# Patient Record
Sex: FEMALE | Race: WHITE | NOT HISPANIC OR LATINO | Employment: OTHER | URBAN - METROPOLITAN AREA
[De-identification: names, ages, dates, MRNs, and addresses within clinical notes are randomized per-mention and may not be internally consistent; named-entity substitution may affect disease eponyms.]

---

## 2019-03-08 NOTE — PATIENT DISCUSSION
Monitor. Some residual astigmatism OU. Discussed possible need for LASIK to improve distance vision.

## 2019-10-10 NOTE — PATIENT DISCUSSION
ASSESSMENT/PLAN:   Toe pain  Patient with pain to her toe after trauma. Has moderately ingrown nail which may be contributing. Recommend that she see podiatrist for evaluation. May need the nail removed or trimmed better. Recommended observation.

## 2021-06-28 ENCOUNTER — NEW PATIENT EMERGENCY (OUTPATIENT)
Dept: URBAN - METROPOLITAN AREA CLINIC 38 | Facility: CLINIC | Age: 36
End: 2021-06-28

## 2021-06-28 DIAGNOSIS — H00.034: ICD-10-CM

## 2021-06-28 PROCEDURE — 99203 OFFICE O/P NEW LOW 30 MIN: CPT

## 2021-06-28 RX ORDER — AMOXICILLIN AND CLAVULANATE POTASSIUM 875; 125 MG/1; 1/1
1 TABLET, FILM COATED ORAL TWICE A DAY
Start: 2021-06-28 | End: 2021-06-28

## 2021-06-28 RX ORDER — AZITHROMYCIN DIHYDRATE 250 MG/1: TABLET, FILM COATED ORAL

## 2021-06-28 ASSESSMENT — VISUAL ACUITY
OS_CC: 20/20
OD_CC: 20/20-1

## 2021-06-28 ASSESSMENT — TONOMETRY
OS_IOP_MMHG: 15
OD_IOP_MMHG: 15

## 2021-07-14 ENCOUNTER — NEW PATIENT EMERGENCY (OUTPATIENT)
Dept: URBAN - METROPOLITAN AREA CLINIC 38 | Facility: CLINIC | Age: 36
End: 2021-07-14

## 2021-07-14 DIAGNOSIS — H52.203: ICD-10-CM

## 2021-07-14 DIAGNOSIS — H00.034: ICD-10-CM

## 2021-07-14 DIAGNOSIS — H52.13: ICD-10-CM

## 2021-07-14 DIAGNOSIS — H04.123: ICD-10-CM

## 2021-07-14 DIAGNOSIS — H02.88B: ICD-10-CM

## 2021-07-14 DIAGNOSIS — H16.223: ICD-10-CM

## 2021-07-14 DIAGNOSIS — H02.88A: ICD-10-CM

## 2021-07-14 PROCEDURE — 92012 INTRM OPH EXAM EST PATIENT: CPT

## 2021-07-14 PROCEDURE — A4262 TEMPORARY TEAR DUCT PLUG: HCPCS

## 2021-07-14 PROCEDURE — 92015 DETERMINE REFRACTIVE STATE: CPT

## 2021-07-14 PROCEDURE — 68761Q PUNCTAL PLUG/QUINTESS DISSOLVABLE PLUG/EACH

## 2021-07-14 RX ORDER — CYCLOSPORINE 0 MG/ML: 1 SOLUTION/ DROPS OPHTHALMIC; TOPICAL TWICE A DAY

## 2021-07-14 RX ORDER — MINERAL OIL 2; 2 MG/.4ML; MG/.4ML: 1 EMULSION OPHTHALMIC

## 2021-07-14 ASSESSMENT — VISUAL ACUITY
OU_CC: 20/20
OS_CC: 20/20-1
OD_CC: 20/20

## 2021-07-14 ASSESSMENT — TONOMETRY
OD_IOP_MMHG: 12
OS_IOP_MMHG: 10

## 2021-08-11 ENCOUNTER — ESTABLISHED COMPREHENSIVE EXAM (OUTPATIENT)
Dept: URBAN - METROPOLITAN AREA CLINIC 38 | Facility: CLINIC | Age: 36
End: 2021-08-11

## 2021-08-11 DIAGNOSIS — H04.123: ICD-10-CM

## 2021-08-11 DIAGNOSIS — H02.88A: ICD-10-CM

## 2021-08-11 DIAGNOSIS — H02.88B: ICD-10-CM

## 2021-08-11 DIAGNOSIS — H16.223: ICD-10-CM

## 2021-08-11 DIAGNOSIS — H00.034: ICD-10-CM

## 2021-08-11 PROCEDURE — 92012 INTRM OPH EXAM EST PATIENT: CPT

## 2021-08-11 RX ORDER — LOTEPREDNOL ETABONATE 5 MG/ML: 1 SUSPENSION/ DROPS OPHTHALMIC AS DIRECTED

## 2021-08-11 ASSESSMENT — VISUAL ACUITY
OS_SC: 20/200
OS_SC: J12
OS_CC: 20/20
OD_SC: J12
OS_CC: J1
OD_SC: 20/200
OD_CC: J1
OD_CC: 20/20

## 2021-08-11 ASSESSMENT — TONOMETRY
OD_IOP_MMHG: 14
OS_IOP_MMHG: 13

## 2021-09-21 ENCOUNTER — FOLLOW UP (OUTPATIENT)
Dept: URBAN - METROPOLITAN AREA CLINIC 38 | Facility: CLINIC | Age: 36
End: 2021-09-21

## 2021-09-21 DIAGNOSIS — H16.223: ICD-10-CM

## 2021-09-21 DIAGNOSIS — H02.88B: ICD-10-CM

## 2021-09-21 DIAGNOSIS — H02.88A: ICD-10-CM

## 2021-09-21 DIAGNOSIS — H04.123: ICD-10-CM

## 2021-09-21 PROCEDURE — 92012 INTRM OPH EXAM EST PATIENT: CPT

## 2021-09-21 RX ORDER — MINERAL OIL, PETROLATUM 425; 573 MG/G; MG/G: OINTMENT OPHTHALMIC EVERY EVENING

## 2021-09-21 ASSESSMENT — TONOMETRY
OS_IOP_MMHG: 14
OD_IOP_MMHG: 16

## 2021-09-21 ASSESSMENT — VISUAL ACUITY
OD_CC: 20/20-1
OS_CC: 20/20

## 2021-10-05 ENCOUNTER — CORNEA CONSULT (OUTPATIENT)
Dept: URBAN - METROPOLITAN AREA CLINIC 43 | Facility: CLINIC | Age: 36
End: 2021-10-05

## 2021-10-05 DIAGNOSIS — H10.13: ICD-10-CM

## 2021-10-05 DIAGNOSIS — H04.123: ICD-10-CM

## 2021-10-05 DIAGNOSIS — H02.88B: ICD-10-CM

## 2021-10-05 DIAGNOSIS — H16.223: ICD-10-CM

## 2021-10-05 DIAGNOSIS — H02.88A: ICD-10-CM

## 2021-10-05 PROCEDURE — 67999I ILUX- MGD

## 2021-10-05 PROCEDURE — 99214 OFFICE O/P EST MOD 30 MIN: CPT

## 2021-10-05 PROCEDURE — 92025 CPTRIZED CORNEAL TOPOGRAPHY: CPT

## 2021-10-05 RX ORDER — MONTELUKAST SODIUM 10 MG/1: 1 TABLET, FILM COATED ORAL ONCE A DAY

## 2021-10-05 ASSESSMENT — VISUAL ACUITY
OD_SC: 20/200
OD_CC: 20/20-2
OS_SC: 20/200
OS_CC: 20/20-1

## 2021-10-05 ASSESSMENT — TONOMETRY
OS_IOP_MMHG: 14
OD_IOP_MMHG: 15

## 2021-10-28 ENCOUNTER — CONTACT LENS EXAM ESTABLISHED (OUTPATIENT)
Dept: URBAN - METROPOLITAN AREA CLINIC 39 | Facility: CLINIC | Age: 36
End: 2021-10-28

## 2021-10-28 DIAGNOSIS — H16.223: ICD-10-CM

## 2021-10-28 DIAGNOSIS — H02.88B: ICD-10-CM

## 2021-10-28 DIAGNOSIS — H02.88A: ICD-10-CM

## 2021-10-28 DIAGNOSIS — H10.13: ICD-10-CM

## 2021-10-28 DIAGNOSIS — H04.123: ICD-10-CM

## 2021-10-28 PROCEDURE — 92310-11 SCLERAL CONTACT LENS FITTING (BOTH EYES)

## 2021-10-28 ASSESSMENT — VISUAL ACUITY
OS_SC: 20/400
OD_SC: J2
OS_CC: CF 5FT
OD_SC: 20/200
OS_SC: J2
OD_CC: 20/400
OU_SC: 20/400
OD_CC: >J10
OS_CC: >J10
OU_SC: J2

## 2021-11-17 ENCOUNTER — CONTACT LENS FOLLOW UP (OUTPATIENT)
Dept: URBAN - METROPOLITAN AREA CLINIC 39 | Facility: CLINIC | Age: 36
End: 2021-11-17

## 2021-11-17 DIAGNOSIS — Z97.3: ICD-10-CM

## 2021-11-17 PROCEDURE — 92310F

## 2021-11-17 ASSESSMENT — VISUAL ACUITY
OD_CC: 20/20-1
OS_CC: 20/20-1
OU_CC: J1+
OS_CC: J1+
OD_CC: J1+
OU_CC: 20/20

## 2021-11-29 ENCOUNTER — REFRACTION ONLY (OUTPATIENT)
Dept: URBAN - METROPOLITAN AREA CLINIC 39 | Facility: CLINIC | Age: 36
End: 2021-11-29

## 2021-11-29 DIAGNOSIS — H52.203: ICD-10-CM

## 2021-11-29 DIAGNOSIS — H52.13: ICD-10-CM

## 2021-11-29 PROCEDURE — 92015GRNC REFRACTION GLASSES RECHECK - NO CHARGE

## 2021-11-29 ASSESSMENT — VISUAL ACUITY
OU_CC: 20/20
OS_CC: J1+
OU_CC: J1+
OD_CC: 20/25-1
OD_CC: J1+
OS_CC: 20/25-1

## 2022-02-24 ENCOUNTER — EMERGENCY VISIT (OUTPATIENT)
Dept: URBAN - METROPOLITAN AREA CLINIC 38 | Facility: CLINIC | Age: 37
End: 2022-02-24

## 2022-02-24 DIAGNOSIS — H16.223: ICD-10-CM

## 2022-02-24 DIAGNOSIS — T15.12XA: ICD-10-CM

## 2022-02-24 PROCEDURE — 65205 REMOVE FOREIGN BODY FROM EYE: CPT

## 2022-02-24 PROCEDURE — 92012 INTRM OPH EXAM EST PATIENT: CPT

## 2022-02-24 RX ORDER — TOBRAMYCIN AND DEXAMETHASONE 1; 3 MG/ML; MG/ML
1 SUSPENSION/ DROPS OPHTHALMIC
Start: 2022-02-24

## 2022-02-24 ASSESSMENT — TONOMETRY
OS_IOP_MMHG: 15
OD_IOP_MMHG: 14

## 2022-02-24 ASSESSMENT — VISUAL ACUITY
OD_CC: 20/20
OS_CC: 20/20

## 2022-07-18 ENCOUNTER — EMERGENCY VISIT (OUTPATIENT)
Dept: URBAN - METROPOLITAN AREA CLINIC 38 | Facility: CLINIC | Age: 37
End: 2022-07-18

## 2022-07-18 DIAGNOSIS — H00.025: ICD-10-CM

## 2022-07-18 DIAGNOSIS — H16.223: ICD-10-CM

## 2022-07-18 PROCEDURE — 92012 INTRM OPH EXAM EST PATIENT: CPT

## 2022-07-18 RX ORDER — DOXYCYCLINE 100 MG/1: 1 CAPSULE ORAL TWICE A DAY

## 2022-07-18 RX ORDER — TOBRAMYCIN AND DEXAMETHASONE 1; 3 MG/ML; MG/ML: 1 SUSPENSION/ DROPS OPHTHALMIC

## 2022-07-18 ASSESSMENT — TONOMETRY
OS_IOP_MMHG: 13
OD_IOP_MMHG: 14

## 2022-07-18 ASSESSMENT — VISUAL ACUITY
OD_CC: 20/20-2
OS_CC: 20/20

## 2022-08-24 ENCOUNTER — COMPREHENSIVE EXAM (OUTPATIENT)
Dept: URBAN - METROPOLITAN AREA CLINIC 38 | Facility: CLINIC | Age: 37
End: 2022-08-24

## 2022-08-24 DIAGNOSIS — H52.13: ICD-10-CM

## 2022-08-24 DIAGNOSIS — H52.203: ICD-10-CM

## 2022-08-24 PROCEDURE — 92015 DETERMINE REFRACTIVE STATE: CPT

## 2022-08-24 PROCEDURE — 92014 COMPRE OPH EXAM EST PT 1/>: CPT

## 2022-08-24 ASSESSMENT — VISUAL ACUITY
OU_CC: J1+
OU_CC: 20/20
OU_SC: J5
OS_SC: J6
OS_CC: J1+
OD_CC: J1+
OD_SC: J6
OD_CC: 20/20
OS_CC: 20/20

## 2022-08-24 ASSESSMENT — TONOMETRY
OD_IOP_MMHG: 15
OS_IOP_MMHG: 12

## 2022-09-07 ENCOUNTER — FOLLOW UP (OUTPATIENT)
Dept: URBAN - METROPOLITAN AREA CLINIC 39 | Facility: CLINIC | Age: 37
End: 2022-09-07

## 2022-09-07 DIAGNOSIS — Z97.3: ICD-10-CM

## 2022-09-07 PROCEDURE — 92310F

## 2022-09-07 ASSESSMENT — TONOMETRY
OD_IOP_MMHG: 10
OS_IOP_MMHG: 10

## 2022-09-07 ASSESSMENT — VISUAL ACUITY
OU_CC: 20/20
OS_CC: 20/20
OD_CC: 20/20

## 2022-10-12 ENCOUNTER — FOLLOW UP (OUTPATIENT)
Dept: URBAN - METROPOLITAN AREA CLINIC 39 | Facility: CLINIC | Age: 37
End: 2022-10-12

## 2022-10-12 DIAGNOSIS — Z97.3: ICD-10-CM

## 2022-10-12 PROCEDURE — 92310F

## 2022-10-12 ASSESSMENT — VISUAL ACUITY
OS_CC: 20/20
OD_CC: J1+
OD_CC: 20/25+2
OU_CC: J1+
OU_CC: 20/20
OS_CC: J1+

## 2022-12-15 ENCOUNTER — FOLLOW UP (OUTPATIENT)
Dept: URBAN - METROPOLITAN AREA CLINIC 39 | Facility: CLINIC | Age: 37
End: 2022-12-15

## 2022-12-15 PROCEDURE — 92310F

## 2022-12-15 ASSESSMENT — VISUAL ACUITY
OU_CC: J1+
OD_CC: 20/20 BLURRY
OU_CC: 20/20 BLURRY
OD_CC: J1+
OS_CC: 20/20 BLURRY
OS_CC: J1+

## 2023-01-11 ENCOUNTER — CONTACT LENSES/GLASSES VISIT (OUTPATIENT)
Dept: URBAN - METROPOLITAN AREA CLINIC 39 | Facility: CLINIC | Age: 38
End: 2023-01-11

## 2023-01-11 DIAGNOSIS — Z97.3: ICD-10-CM

## 2023-01-11 PROCEDURE — 92310F

## 2023-01-11 ASSESSMENT — VISUAL ACUITY
OU_SC: 20/400
OD_SC: 20/400
OS_SC: 20/400

## 2023-01-31 ENCOUNTER — CONTACT LENSES/GLASSES VISIT (OUTPATIENT)
Dept: URBAN - METROPOLITAN AREA CLINIC 39 | Facility: CLINIC | Age: 38
End: 2023-01-31

## 2023-01-31 DIAGNOSIS — Z97.3: ICD-10-CM

## 2023-01-31 PROCEDURE — 92310F

## 2023-01-31 ASSESSMENT — VISUAL ACUITY
OD_CC: 20/20
OS_CC: 20/20

## 2023-05-12 ENCOUNTER — CLINIC PROCEDURE ONLY (OUTPATIENT)
Dept: URBAN - METROPOLITAN AREA CLINIC 39 | Facility: CLINIC | Age: 38
End: 2023-05-12

## 2023-05-12 DIAGNOSIS — H04.123: ICD-10-CM

## 2023-05-12 PROCEDURE — 99199ST SERUM TEARS

## 2023-06-14 ENCOUNTER — CONTACT LENSES/GLASSES VISIT (OUTPATIENT)
Dept: URBAN - METROPOLITAN AREA CLINIC 39 | Facility: CLINIC | Age: 38
End: 2023-06-14

## 2023-06-14 DIAGNOSIS — H52.203: ICD-10-CM

## 2023-06-14 DIAGNOSIS — H52.13: ICD-10-CM

## 2023-06-14 PROCEDURE — 92015GRNC REFRACTION GLASSES RECHECK - NO CHARGE

## 2023-06-14 ASSESSMENT — VISUAL ACUITY
OD_CC: 20/20 BLURRY
OU_CC: 20/20
OS_CC: 20/20

## 2023-08-18 ENCOUNTER — CLINIC PROCEDURE ONLY (OUTPATIENT)
Dept: URBAN - METROPOLITAN AREA CLINIC 39 | Facility: CLINIC | Age: 38
End: 2023-08-18

## 2023-08-18 DIAGNOSIS — H16.223: ICD-10-CM

## 2023-08-18 PROCEDURE — 99199ST SERUM TEARS

## 2023-10-19 ENCOUNTER — COMPREHENSIVE EXAM (OUTPATIENT)
Dept: URBAN - METROPOLITAN AREA CLINIC 39 | Facility: CLINIC | Age: 38
End: 2023-10-19

## 2023-10-19 DIAGNOSIS — H52.13: ICD-10-CM

## 2023-10-19 DIAGNOSIS — H52.203: ICD-10-CM

## 2023-10-19 PROCEDURE — 92014 COMPRE OPH EXAM EST PT 1/>: CPT

## 2023-10-19 PROCEDURE — 92015 DETERMINE REFRACTIVE STATE: CPT

## 2023-10-19 ASSESSMENT — VISUAL ACUITY
OS_CC: J1+
OS_CC: 20/20
OD_CC: J1+
OU_CC: 20/15
OU_CC: J1+
OD_CC: 20/20-1

## 2023-10-19 ASSESSMENT — TONOMETRY
OD_IOP_MMHG: 15
OS_IOP_MMHG: 13

## 2024-02-07 NOTE — PATIENT DISCUSSION
+VAN.
1 week PO: Patient is doing well post-operatively. The importance of post-op drop compliance was emphasized. Drop scheduled reviewed with patient. Patient to call if any visual changes or concerns.
Continue current management.
IOL with Dr. Salvador Horton. Goal CV Demetria.
OK for IOL OS.
Patient is cleared for anesthesia.
Patient understands condition, prognosis and need for follow up care.
Plan OD 1st ; SYM/CV/B+ Goal PAMELA.
Plan OS 2nd TMF +3.25 Goal PAMELA VS CV/B+ PAMELA.
The patient feels that the cataract is significantly impacting daily activities and has elected cataract surgery. The risks, benefits, and alternatives to surgery were discussed. The patient elects to proceed with surgery.
monitor for increased edema post Cat SX.
within normal limits

## 2024-03-08 ENCOUNTER — CLINIC PROCEDURE ONLY (OUTPATIENT)
Dept: URBAN - METROPOLITAN AREA CLINIC 39 | Facility: CLINIC | Age: 39
End: 2024-03-08

## 2024-03-08 DIAGNOSIS — H16.223: ICD-10-CM

## 2024-03-08 PROCEDURE — 99199ST SERUM TEARS

## 2024-05-01 ENCOUNTER — EMERGENCY VISIT (OUTPATIENT)
Dept: URBAN - METROPOLITAN AREA CLINIC 38 | Facility: CLINIC | Age: 39
End: 2024-05-01

## 2024-05-01 DIAGNOSIS — H16.223: ICD-10-CM

## 2024-05-01 PROCEDURE — A4262 TEMPORARY TEAR DUCT PLUG: HCPCS | Mod: 50

## 2024-05-01 PROCEDURE — 99213 OFFICE O/P EST LOW 20 MIN: CPT

## 2024-05-01 PROCEDURE — 68761Q PUNCTAL PLUG/QUINTESS DISSOLVABLE PLUG/EACH: Mod: 50

## 2024-05-01 ASSESSMENT — TONOMETRY
OS_IOP_MMHG: 15
OD_IOP_MMHG: 14

## 2024-05-01 ASSESSMENT — VISUAL ACUITY
OS_CC: 20/20
OD_CC: 20/20
OU_CC: 20/20

## 2024-09-17 ENCOUNTER — CLINIC PROCEDURE ONLY (OUTPATIENT)
Dept: URBAN - METROPOLITAN AREA CLINIC 39 | Facility: CLINIC | Age: 39
End: 2024-09-17

## 2024-09-17 DIAGNOSIS — H16.223: ICD-10-CM

## 2024-09-17 PROCEDURE — 99199ST SERUM TEARS

## 2024-12-19 NOTE — PATIENT DISCUSSION
+VAN.
Continue current management.
Patient understands condition, prognosis and need for follow up care.
Plan OD 1st ; SYM/CV/B+ Goal PAMELA.
Plan OS 2nd TMF +3.25 Goal PAMELA VS CV/B+ PAMELA.
Reassess after #1 stable.
The patient feels that the cataract is significantly impacting daily activities and has elected cataract surgery. The risks, benefits, and alternatives to surgery were discussed. The patient elects to proceed with surgery.
The types of intraocular lenses were reviewed with the patient along with a discussion of their various strengths and weaknesses.
monitor for increased edema post Cat SX.
endoscopy

## 2025-02-10 ENCOUNTER — COMPREHENSIVE EXAM (OUTPATIENT)
Age: 40
End: 2025-02-10

## 2025-02-10 DIAGNOSIS — H52.203: ICD-10-CM

## 2025-02-10 DIAGNOSIS — Z01.00: ICD-10-CM

## 2025-02-10 DIAGNOSIS — H52.13: ICD-10-CM

## 2025-02-10 PROCEDURE — 92014 COMPRE OPH EXAM EST PT 1/>: CPT

## 2025-02-10 PROCEDURE — 92015 DETERMINE REFRACTIVE STATE: CPT
